# Patient Record
Sex: MALE | Race: WHITE
[De-identification: names, ages, dates, MRNs, and addresses within clinical notes are randomized per-mention and may not be internally consistent; named-entity substitution may affect disease eponyms.]

---

## 2017-12-26 ENCOUNTER — HOSPITAL ENCOUNTER (INPATIENT)
Dept: HOSPITAL 94 - ER | Age: 67
LOS: 2 days | Discharge: HOME HEALTH | DRG: 604 | End: 2017-12-28
Attending: INTERNAL MEDICINE | Admitting: FAMILY MEDICINE
Payer: MEDICARE

## 2017-12-26 VITALS — HEIGHT: 78 IN | BODY MASS INDEX: 21.94 KG/M2 | WEIGHT: 189.6 LBS

## 2017-12-26 DIAGNOSIS — Z79.84: ICD-10-CM

## 2017-12-26 DIAGNOSIS — I48.0: ICD-10-CM
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
## 2017-12-26 DIAGNOSIS — R29.6: ICD-10-CM

## 2017-12-26 DIAGNOSIS — S20.212A: Primary | ICD-10-CM

## 2017-12-26 DIAGNOSIS — J44.1: ICD-10-CM

## 2017-12-26 DIAGNOSIS — E11.65: ICD-10-CM

## 2017-12-26 DIAGNOSIS — F17.200: ICD-10-CM

## 2017-12-26 DIAGNOSIS — Z88.2: ICD-10-CM

## 2017-12-26 DIAGNOSIS — Z79.82: ICD-10-CM

## 2017-12-26 DIAGNOSIS — I25.10: ICD-10-CM
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
## 2017-12-26 DIAGNOSIS — J20.9: ICD-10-CM
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
## 2017-12-26 DIAGNOSIS — Z79.899: ICD-10-CM

## 2017-12-26 DIAGNOSIS — Z95.1: ICD-10-CM
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
## 2017-12-26 DIAGNOSIS — J44.0: ICD-10-CM

## 2017-12-26 DIAGNOSIS — I50.23: ICD-10-CM

## 2017-12-26 DIAGNOSIS — Z85.118: ICD-10-CM

## 2017-12-26 DIAGNOSIS — G93.41: ICD-10-CM

## 2017-12-26 LAB
ALBUMIN SERPL BCP-MCNC: 3.3 G/DL (ref 3.4–5)
ALBUMIN/GLOB SERPL: 1 {RATIO} (ref 1.1–1.5)
ALP SERPL-CCNC: 90 IU/L (ref 46–116)
ALT SERPL W P-5'-P-CCNC: 28 U/L (ref 12–78)
ANION GAP SERPL CALCULATED.3IONS-SCNC: 7 MMOL/L (ref 8–16)
APTT PPP: 29 SECONDS (ref 22–32)
AST SERPL W P-5'-P-CCNC: 27 U/L (ref 10–37)
BASOPHILS # BLD AUTO: 0 X10'3 (ref 0–0.2)
BASOPHILS NFR BLD AUTO: 0 % (ref 0–1)
BILIRUB SERPL-MCNC: 0.5 MG/DL (ref 0.1–1)
BUN SERPL-MCNC: 16 MG/DL (ref 7–18)
BUN/CREAT SERPL: 13.7 (ref 5.4–32)
CALCIUM SERPL-MCNC: 8.7 MG/DL (ref 8.5–10.1)
CHLORIDE SERPL-SCNC: 102 MMOL/L (ref 99–107)
CLARITY UR: CLEAR
CO2 SERPL-SCNC: 32.4 MMOL/L (ref 24–32)
COLOR UR: YELLOW
CREAT SERPL-MCNC: 1.17 MG/DL (ref 0.6–1.1)
EOSINOPHIL # BLD AUTO: 0.1 X10'3 (ref 0–0.9)
EOSINOPHIL NFR BLD AUTO: 0.6 % (ref 0–6)
ERYTHROCYTE [DISTWIDTH] IN BLOOD BY AUTOMATED COUNT: 13.6 % (ref 11.5–14.5)
GFR SERPL CREATININE-BSD FRML MDRD: 62 ML/MIN
GLUCOSE SERPL-MCNC: 171 MG/DL (ref 70–104)
GLUCOSE UR STRIP-MCNC: NEGATIVE MG/DL
HBA1C MFR BLD: 5.7 % (ref 4.5–6.2)
HCT VFR BLD AUTO: 37.1 % (ref 42–52)
HGB BLD-MCNC: 12.5 G/DL (ref 14–17.9)
HGB UR QL STRIP: NEGATIVE
INR PPP: 1 INR
KETONES UR STRIP-MCNC: NEGATIVE MG/DL
LEUKOCYTE ESTERASE UR QL STRIP: NEGATIVE
LYMPHOCYTES # BLD AUTO: 0.9 X10'3 (ref 1.1–4.8)
LYMPHOCYTES NFR BLD AUTO: 8 % (ref 21–51)
MAGNESIUM SERPL-MCNC: 2.2 MG/DL (ref 1.5–2.4)
MCH RBC QN AUTO: 33.3 PG (ref 27–31)
MCHC RBC AUTO-ENTMCNC: 33.7 % (ref 33–36.5)
MCV RBC AUTO: 98.8 FL (ref 78–98)
MONOCYTES # BLD AUTO: 0.7 X10'3 (ref 0–0.9)
MONOCYTES NFR BLD AUTO: 6.5 % (ref 2–12)
NEUTROPHILS # BLD AUTO: 9.1 X10'3 (ref 1.8–7.7)
NEUTROPHILS NFR BLD AUTO: 84.9 % (ref 42–75)
NITRITE UR QL STRIP: NEGATIVE
PH UR STRIP: 6 [PH] (ref 4.8–8)
PHOSPHATE SERPL-MCNC: 2.5 MG/DL (ref 2.3–4.5)
PLATELET # BLD AUTO: 193 X10'3 (ref 140–440)
PMV BLD AUTO: 7.1 FL (ref 7.4–10.4)
POTASSIUM SERPL-SCNC: 3.7 MMOL/L (ref 3.5–5.1)
PROT SERPL-MCNC: 6.7 G/DL (ref 6.4–8.2)
PROT UR QL STRIP: NEGATIVE MG/DL
PROTHROMBIN TIME: 9.9 SECONDS (ref 9–12)
RBC # BLD AUTO: 3.75 X10'6 (ref 4.7–6.1)
SODIUM SERPL-SCNC: 141 MMOL/L (ref 135–145)
SP GR UR STRIP: 1.01 (ref 1–1.03)
URN COLLECT METHOD CLASS: (no result)
UROBILINOGEN UR STRIP-MCNC: 4 E.U/DL (ref 0.2–1)
WBC # BLD AUTO: 10.7 X10'3 (ref 4.5–11)

## 2017-12-26 PROCEDURE — 36415 COLL VENOUS BLD VENIPUNCTURE: CPT

## 2017-12-26 PROCEDURE — 85730 THROMBOPLASTIN TIME PARTIAL: CPT

## 2017-12-26 PROCEDURE — 94760 N-INVAS EAR/PLS OXIMETRY 1: CPT

## 2017-12-26 PROCEDURE — 94640 AIRWAY INHALATION TREATMENT: CPT

## 2017-12-26 PROCEDURE — 99285 EMERGENCY DEPT VISIT HI MDM: CPT

## 2017-12-26 PROCEDURE — 96374 THER/PROPH/DIAG INJ IV PUSH: CPT

## 2017-12-26 PROCEDURE — 87070 CULTURE OTHR SPECIMN AEROBIC: CPT

## 2017-12-26 PROCEDURE — 84100 ASSAY OF PHOSPHORUS: CPT

## 2017-12-26 PROCEDURE — 97162 PT EVAL MOD COMPLEX 30 MIN: CPT

## 2017-12-26 PROCEDURE — 81003 URINALYSIS AUTO W/O SCOPE: CPT

## 2017-12-26 PROCEDURE — 83605 ASSAY OF LACTIC ACID: CPT

## 2017-12-26 PROCEDURE — 83735 ASSAY OF MAGNESIUM: CPT

## 2017-12-26 PROCEDURE — 80053 COMPREHEN METABOLIC PANEL: CPT

## 2017-12-26 PROCEDURE — 85610 PROTHROMBIN TIME: CPT

## 2017-12-26 PROCEDURE — 85025 COMPLETE CBC W/AUTO DIFF WBC: CPT

## 2017-12-26 PROCEDURE — 70450 CT HEAD/BRAIN W/O DYE: CPT

## 2017-12-26 PROCEDURE — 87040 BLOOD CULTURE FOR BACTERIA: CPT

## 2017-12-26 PROCEDURE — 82948 REAGENT STRIP/BLOOD GLUCOSE: CPT

## 2017-12-26 PROCEDURE — 83880 ASSAY OF NATRIURETIC PEPTIDE: CPT

## 2017-12-26 PROCEDURE — 71010: CPT

## 2017-12-26 PROCEDURE — 83036 HEMOGLOBIN GLYCOSYLATED A1C: CPT

## 2017-12-26 RX ADMIN — SODIUM CHLORIDE SCH MLS/HR: 9 INJECTION INTRAMUSCULAR; INTRAVENOUS; SUBCUTANEOUS at 23:01

## 2017-12-26 RX ADMIN — VARENICLINE TARTRATE SCH MG: 0.5 TABLET, FILM COATED ORAL at 23:04

## 2017-12-27 VITALS — SYSTOLIC BLOOD PRESSURE: 113 MMHG | DIASTOLIC BLOOD PRESSURE: 64 MMHG

## 2017-12-27 VITALS — DIASTOLIC BLOOD PRESSURE: 72 MMHG | SYSTOLIC BLOOD PRESSURE: 145 MMHG

## 2017-12-27 VITALS — SYSTOLIC BLOOD PRESSURE: 120 MMHG | DIASTOLIC BLOOD PRESSURE: 68 MMHG

## 2017-12-27 VITALS — DIASTOLIC BLOOD PRESSURE: 66 MMHG | SYSTOLIC BLOOD PRESSURE: 111 MMHG

## 2017-12-27 VITALS — SYSTOLIC BLOOD PRESSURE: 106 MMHG | DIASTOLIC BLOOD PRESSURE: 51 MMHG

## 2017-12-27 VITALS — DIASTOLIC BLOOD PRESSURE: 60 MMHG | SYSTOLIC BLOOD PRESSURE: 114 MMHG

## 2017-12-27 LAB
ALBUMIN SERPL BCP-MCNC: 2.6 G/DL (ref 3.4–5)
ALBUMIN/GLOB SERPL: 0.8 {RATIO} (ref 1.1–1.5)
ALP SERPL-CCNC: 64 IU/L (ref 46–116)
ALT SERPL W P-5'-P-CCNC: 18 U/L (ref 12–78)
ANION GAP SERPL CALCULATED.3IONS-SCNC: 7 MMOL/L (ref 8–16)
AST SERPL W P-5'-P-CCNC: 18 U/L (ref 10–37)
BASOPHILS # BLD AUTO: 0 X10'3 (ref 0–0.2)
BASOPHILS NFR BLD AUTO: 0.1 % (ref 0–1)
BILIRUB SERPL-MCNC: 0.4 MG/DL (ref 0.1–1)
BUN SERPL-MCNC: 17 MG/DL (ref 7–18)
BUN/CREAT SERPL: 18.9 (ref 5.4–32)
CALCIUM SERPL-MCNC: 8 MG/DL (ref 8.5–10.1)
CHLORIDE SERPL-SCNC: 107 MMOL/L (ref 99–107)
CO2 SERPL-SCNC: 26.8 MMOL/L (ref 24–32)
CREAT SERPL-MCNC: 0.9 MG/DL (ref 0.6–1.1)
EOSINOPHIL # BLD AUTO: 0 X10'3 (ref 0–0.9)
EOSINOPHIL NFR BLD AUTO: 0 % (ref 0–6)
ERYTHROCYTE [DISTWIDTH] IN BLOOD BY AUTOMATED COUNT: 14 % (ref 11.5–14.5)
GFR SERPL CREATININE-BSD FRML MDRD: 84 ML/MIN
GLUCOSE SERPL-MCNC: 176 MG/DL (ref 70–104)
HCT VFR BLD AUTO: 31.5 % (ref 42–52)
HGB BLD-MCNC: 10.5 G/DL (ref 14–17.9)
LYMPHOCYTES # BLD AUTO: 0.5 X10'3 (ref 1.1–4.8)
LYMPHOCYTES NFR BLD AUTO: 6.9 % (ref 21–51)
MCH RBC QN AUTO: 32.9 PG (ref 27–31)
MCHC RBC AUTO-ENTMCNC: 33.3 % (ref 33–36.5)
MCV RBC AUTO: 98.9 FL (ref 78–98)
MONOCYTES # BLD AUTO: 0.2 X10'3 (ref 0–0.9)
MONOCYTES NFR BLD AUTO: 2.9 % (ref 2–12)
NEUTROPHILS # BLD AUTO: 6.1 X10'3 (ref 1.8–7.7)
NEUTROPHILS NFR BLD AUTO: 90.1 % (ref 42–75)
PLATELET # BLD AUTO: 174 X10'3 (ref 140–440)
PMV BLD AUTO: 7 FL (ref 7.4–10.4)
POTASSIUM SERPL-SCNC: 4 MMOL/L (ref 3.5–5.1)
PROT SERPL-MCNC: 5.7 G/DL (ref 6.4–8.2)
RBC # BLD AUTO: 3.19 X10'6 (ref 4.7–6.1)
SODIUM SERPL-SCNC: 141 MMOL/L (ref 135–145)
WBC # BLD AUTO: 6.7 X10'3 (ref 4.5–11)

## 2017-12-27 RX ADMIN — ENOXAPARIN SODIUM SCH MG: 60 INJECTION SUBCUTANEOUS at 07:54

## 2017-12-27 RX ADMIN — PANTOPRAZOLE SODIUM SCH MG: 40 TABLET, DELAYED RELEASE ORAL at 07:55

## 2017-12-27 RX ADMIN — INSULIN LISPRO SCH UNITS: 100 INJECTION, SOLUTION INTRAVENOUS; SUBCUTANEOUS at 19:43

## 2017-12-27 RX ADMIN — DOCUSATE SODIUM SCH MG: 100 CAPSULE, LIQUID FILLED ORAL at 19:55

## 2017-12-27 RX ADMIN — BUPROPION HYDROCHLORIDE SCH MG: 75 TABLET, FILM COATED ORAL at 08:00

## 2017-12-27 RX ADMIN — VARENICLINE TARTRATE SCH MG: 0.5 TABLET, FILM COATED ORAL at 08:00

## 2017-12-27 RX ADMIN — Medication SCH MMU: at 17:38

## 2017-12-27 RX ADMIN — ENOXAPARIN SODIUM SCH MG: 60 INJECTION SUBCUTANEOUS at 19:39

## 2017-12-27 RX ADMIN — Medication SCH MMU: at 07:30

## 2017-12-27 RX ADMIN — INSULIN LISPRO SCH UNITS: 100 INJECTION, SOLUTION INTRAVENOUS; SUBCUTANEOUS at 14:04

## 2017-12-27 RX ADMIN — DULOXETINE HYDROCHLORIDE SCH MG: 30 CAPSULE, DELAYED RELEASE ORAL at 07:55

## 2017-12-27 RX ADMIN — DOCUSATE SODIUM SCH MG: 100 CAPSULE, LIQUID FILLED ORAL at 07:55

## 2017-12-27 RX ADMIN — SODIUM CHLORIDE SCH MLS/HR: 9 INJECTION INTRAMUSCULAR; INTRAVENOUS; SUBCUTANEOUS at 08:15

## 2017-12-27 RX ADMIN — Medication SCH MG: at 07:55

## 2017-12-27 RX ADMIN — VARENICLINE TARTRATE SCH MG: 0.5 TABLET, FILM COATED ORAL at 19:38

## 2017-12-28 VITALS — SYSTOLIC BLOOD PRESSURE: 117 MMHG | DIASTOLIC BLOOD PRESSURE: 68 MMHG

## 2017-12-28 VITALS — DIASTOLIC BLOOD PRESSURE: 74 MMHG | SYSTOLIC BLOOD PRESSURE: 128 MMHG

## 2017-12-28 VITALS — SYSTOLIC BLOOD PRESSURE: 123 MMHG | DIASTOLIC BLOOD PRESSURE: 66 MMHG

## 2017-12-28 LAB
ALBUMIN SERPL BCP-MCNC: 2.8 G/DL (ref 3.4–5)
ALBUMIN/GLOB SERPL: 0.9 {RATIO} (ref 1.1–1.5)
ALP SERPL-CCNC: 67 IU/L (ref 46–116)
ALT SERPL W P-5'-P-CCNC: 23 U/L (ref 12–78)
ANION GAP SERPL CALCULATED.3IONS-SCNC: 7 MMOL/L (ref 8–16)
AST SERPL W P-5'-P-CCNC: 15 U/L (ref 10–37)
BASOPHILS # BLD AUTO: 0 X10'3 (ref 0–0.2)
BASOPHILS NFR BLD AUTO: 0 % (ref 0–1)
BILIRUB SERPL-MCNC: 0.3 MG/DL (ref 0.1–1)
BUN SERPL-MCNC: 20 MG/DL (ref 7–18)
BUN/CREAT SERPL: 21.3 (ref 5.4–32)
CALCIUM SERPL-MCNC: 8.5 MG/DL (ref 8.5–10.1)
CHLORIDE SERPL-SCNC: 108 MMOL/L (ref 99–107)
CO2 SERPL-SCNC: 27.9 MMOL/L (ref 24–32)
CREAT SERPL-MCNC: 0.94 MG/DL (ref 0.6–1.1)
EOSINOPHIL # BLD AUTO: 0.1 X10'3 (ref 0–0.9)
EOSINOPHIL NFR BLD AUTO: 0.9 % (ref 0–6)
ERYTHROCYTE [DISTWIDTH] IN BLOOD BY AUTOMATED COUNT: 14 % (ref 11.5–14.5)
GFR SERPL CREATININE-BSD FRML MDRD: 80 ML/MIN
GLUCOSE SERPL-MCNC: 199 MG/DL (ref 70–104)
HCT VFR BLD AUTO: 34.5 % (ref 42–52)
HGB BLD-MCNC: 11.3 G/DL (ref 14–17.9)
LYMPHOCYTES # BLD AUTO: 0.7 X10'3 (ref 1.1–4.8)
LYMPHOCYTES NFR BLD AUTO: 7.9 % (ref 21–51)
MCH RBC QN AUTO: 32.8 PG (ref 27–31)
MCHC RBC AUTO-ENTMCNC: 32.9 % (ref 33–36.5)
MCV RBC AUTO: 99.7 FL (ref 78–98)
MONOCYTES # BLD AUTO: 0.2 X10'3 (ref 0–0.9)
MONOCYTES NFR BLD AUTO: 1.9 % (ref 2–12)
NEUTROPHILS # BLD AUTO: 7.9 X10'3 (ref 1.8–7.7)
NEUTROPHILS NFR BLD AUTO: 89.3 % (ref 42–75)
PLATELET # BLD AUTO: 181 X10'3 (ref 140–440)
PMV BLD AUTO: 7.4 FL (ref 7.4–10.4)
POTASSIUM SERPL-SCNC: 4.5 MMOL/L (ref 3.5–5.1)
PROT SERPL-MCNC: 6 G/DL (ref 6.4–8.2)
RBC # BLD AUTO: 3.46 X10'6 (ref 4.7–6.1)
SODIUM SERPL-SCNC: 143 MMOL/L (ref 135–145)
WBC # BLD AUTO: 8.8 X10'3 (ref 4.5–11)

## 2017-12-28 RX ADMIN — BUPROPION HYDROCHLORIDE SCH MG: 75 TABLET, FILM COATED ORAL at 09:24

## 2017-12-28 RX ADMIN — INSULIN LISPRO SCH UNITS: 100 INJECTION, SOLUTION INTRAVENOUS; SUBCUTANEOUS at 09:41

## 2017-12-28 RX ADMIN — DOCUSATE SODIUM SCH MG: 100 CAPSULE, LIQUID FILLED ORAL at 08:00

## 2017-12-28 RX ADMIN — PANTOPRAZOLE SODIUM SCH MG: 40 TABLET, DELAYED RELEASE ORAL at 09:24

## 2017-12-28 RX ADMIN — ENOXAPARIN SODIUM SCH MG: 60 INJECTION SUBCUTANEOUS at 09:22

## 2017-12-28 RX ADMIN — DULOXETINE HYDROCHLORIDE SCH MG: 30 CAPSULE, DELAYED RELEASE ORAL at 09:23

## 2017-12-28 RX ADMIN — VARENICLINE TARTRATE SCH MG: 0.5 TABLET, FILM COATED ORAL at 09:23

## 2017-12-28 RX ADMIN — Medication SCH MMU: at 07:30

## 2017-12-28 RX ADMIN — Medication SCH MG: at 09:22

## 2020-02-28 ENCOUNTER — HOSPITAL ENCOUNTER (EMERGENCY)
Dept: HOSPITAL 94 - ER | Age: 70
Discharge: HOME | End: 2020-02-28
Payer: MEDICARE

## 2020-02-28 VITALS — SYSTOLIC BLOOD PRESSURE: 120 MMHG | DIASTOLIC BLOOD PRESSURE: 58 MMHG

## 2020-02-28 VITALS — BODY MASS INDEX: 20.48 KG/M2 | WEIGHT: 138.3 LBS | HEIGHT: 69 IN

## 2020-02-28 DIAGNOSIS — J44.9: ICD-10-CM

## 2020-02-28 DIAGNOSIS — R07.81: ICD-10-CM

## 2020-02-28 DIAGNOSIS — J90: ICD-10-CM

## 2020-02-28 DIAGNOSIS — Z85.118: ICD-10-CM

## 2020-02-28 DIAGNOSIS — Z95.1: ICD-10-CM

## 2020-02-28 DIAGNOSIS — F17.200: ICD-10-CM

## 2020-02-28 DIAGNOSIS — I10: ICD-10-CM

## 2020-02-28 DIAGNOSIS — Z98.890: ICD-10-CM

## 2020-02-28 DIAGNOSIS — Z88.2: ICD-10-CM

## 2020-02-28 DIAGNOSIS — Z79.82: ICD-10-CM

## 2020-02-28 DIAGNOSIS — G20: Primary | ICD-10-CM

## 2020-02-28 DIAGNOSIS — Z79.899: ICD-10-CM

## 2020-02-28 DIAGNOSIS — Z79.2: ICD-10-CM

## 2020-02-28 DIAGNOSIS — E11.9: ICD-10-CM

## 2020-02-28 LAB
ALBUMIN SERPL BCP-MCNC: 3.1 G/DL (ref 3.4–5)
ALBUMIN/GLOB SERPL: 0.9 {RATIO} (ref 1.1–1.5)
ALP SERPL-CCNC: 75 IU/L (ref 46–116)
ALT SERPL W P-5'-P-CCNC: < 6 U/L (ref 12–78)
ANION GAP SERPL CALCULATED.3IONS-SCNC: 4 MMOL/L (ref 8–16)
AST SERPL W P-5'-P-CCNC: 18 U/L (ref 10–37)
BASOPHILS # BLD AUTO: 0 X10'3 (ref 0–0.2)
BASOPHILS NFR BLD AUTO: 0.3 % (ref 0–1)
BILIRUB SERPL-MCNC: 0.5 MG/DL (ref 0.1–1)
BUN SERPL-MCNC: 15 MG/DL (ref 7–18)
BUN/CREAT SERPL: 13.4 (ref 5.4–32)
CALCIUM SERPL-MCNC: 9 MG/DL (ref 8.5–10.1)
CHLORIDE SERPL-SCNC: 104 MMOL/L (ref 99–107)
CO2 SERPL-SCNC: 35.8 MMOL/L (ref 24–32)
CREAT SERPL-MCNC: 1.12 MG/DL (ref 0.6–1.1)
EOSINOPHIL # BLD AUTO: 0.1 X10'3 (ref 0–0.9)
EOSINOPHIL NFR BLD AUTO: 1.2 % (ref 0–6)
ERYTHROCYTE [DISTWIDTH] IN BLOOD BY AUTOMATED COUNT: 14.6 % (ref 11.5–14.5)
GFR SERPL CREATININE-BSD FRML MDRD: 64 ML/MIN
GLUCOSE SERPL-MCNC: 155 MG/DL (ref 70–104)
HCT VFR BLD AUTO: 36.4 % (ref 42–52)
HGB BLD-MCNC: 12 G/DL (ref 14–17.9)
LYMPHOCYTES # BLD AUTO: 0.9 X10'3 (ref 1.1–4.8)
LYMPHOCYTES NFR BLD AUTO: 10.2 % (ref 21–51)
MCH RBC QN AUTO: 31.1 PG (ref 27–31)
MCHC RBC AUTO-ENTMCNC: 33.1 G/DL (ref 33–36.5)
MCV RBC AUTO: 94.1 FL (ref 78–98)
MONOCYTES # BLD AUTO: 0.9 X10'3 (ref 0–0.9)
MONOCYTES NFR BLD AUTO: 11 % (ref 2–12)
NEUTROPHILS # BLD AUTO: 6.5 X10'3 (ref 1.8–7.7)
NEUTROPHILS NFR BLD AUTO: 77.3 % (ref 42–75)
PLATELET # BLD AUTO: 247 X10'3 (ref 140–440)
PMV BLD AUTO: 7 FL (ref 7.4–10.4)
POTASSIUM SERPL-SCNC: 4.3 MMOL/L (ref 3.5–5.1)
PROT SERPL-MCNC: 6.7 G/DL (ref 6.4–8.2)
RBC # BLD AUTO: 3.86 X10'6 (ref 4.7–6.1)
SODIUM SERPL-SCNC: 144 MMOL/L (ref 135–145)
WBC # BLD AUTO: 8.4 X10'3 (ref 4.5–11)

## 2020-02-28 PROCEDURE — 99285 EMERGENCY DEPT VISIT HI MDM: CPT

## 2020-02-28 PROCEDURE — 71101 X-RAY EXAM UNILAT RIBS/CHEST: CPT

## 2020-02-28 PROCEDURE — 84484 ASSAY OF TROPONIN QUANT: CPT

## 2020-02-28 PROCEDURE — 36415 COLL VENOUS BLD VENIPUNCTURE: CPT

## 2020-02-28 PROCEDURE — 84145 PROCALCITONIN (PCT): CPT

## 2020-02-28 PROCEDURE — 80053 COMPREHEN METABOLIC PANEL: CPT

## 2020-02-28 PROCEDURE — 85025 COMPLETE CBC W/AUTO DIFF WBC: CPT

## 2020-02-28 PROCEDURE — 83880 ASSAY OF NATRIURETIC PEPTIDE: CPT

## 2020-02-28 NOTE — NUR
Pt's niece Yoselin Kulkarni called for an update on the patient. Let her know that we 
do not have results yet. She stated that she should be here within the next 
couple hours.

## 2020-02-28 NOTE — NUR
Pt tried to get out of bed, stating he was going to walk out of the ED. Pt 
reoriented to the fact that he was in the ER and that we needed him to get back 
in bed. Pt assisted back to bed. Pt very unstable on his feet.